# Patient Record
Sex: FEMALE | Race: WHITE | NOT HISPANIC OR LATINO | Employment: FULL TIME | URBAN - NONMETROPOLITAN AREA
[De-identification: names, ages, dates, MRNs, and addresses within clinical notes are randomized per-mention and may not be internally consistent; named-entity substitution may affect disease eponyms.]

---

## 2017-10-05 ENCOUNTER — OFFICE VISIT (OUTPATIENT)
Dept: RETAIL CLINIC | Facility: CLINIC | Age: 37
End: 2017-10-05

## 2017-10-05 VITALS
SYSTOLIC BLOOD PRESSURE: 116 MMHG | DIASTOLIC BLOOD PRESSURE: 75 MMHG | OXYGEN SATURATION: 98 % | TEMPERATURE: 97.3 F | HEART RATE: 66 BPM

## 2017-10-05 DIAGNOSIS — J01.40 ACUTE PANSINUSITIS, RECURRENCE NOT SPECIFIED: Primary | ICD-10-CM

## 2017-10-05 PROCEDURE — 99202 OFFICE O/P NEW SF 15 MIN: CPT | Performed by: NURSE PRACTITIONER

## 2017-10-05 RX ORDER — CETIRIZINE HYDROCHLORIDE 10 MG/1
10 TABLET ORAL DAILY
COMMUNITY

## 2017-10-05 RX ORDER — AZITHROMYCIN 250 MG/1
TABLET, FILM COATED ORAL
Qty: 1 TABLET | Refills: 0 | Status: SHIPPED | OUTPATIENT
Start: 2017-10-05 | End: 2018-08-19

## 2017-10-05 NOTE — PROGRESS NOTES
Subjective   Shruthi Weeks is a 37 y.o. female.     Sinusitis   This is a new problem. The current episode started 1 to 4 weeks ago (3 weeks). The problem has been gradually worsening (Last couple days has all gone to her head) since onset. Associated symptoms include congestion, coughing and sinus pressure (Left side). Pertinent negatives include no ear pain (Crackling, popping) or sore throat. Treatments tried: Aspirin; Zyrtec; Sudafed, Mucinex. The treatment provided no relief.        The following portions of the patient's history were reviewed and updated as appropriate: allergies, current medications, past family history, past medical history, past social history, past surgical history and problem list.    Review of Systems   Constitutional: Negative for fever.   HENT: Positive for congestion, postnasal drip and sinus pressure (Left side). Negative for ear pain (Crackling, popping) and sore throat.    Eyes: Negative.    Respiratory: Positive for cough.    Gastrointestinal: Negative for diarrhea, nausea and vomiting.   Allergic/Immunologic: Positive for environmental allergies.       Objective   Physical Exam   Constitutional: She appears well-developed and well-nourished. No distress.   HENT:   Right Ear: External ear normal. Tympanic membrane is bulging. Tympanic membrane is not erythematous.   Left Ear: External ear normal. Tympanic membrane is bulging. Tympanic membrane is not erythematous.   Nose: Right sinus exhibits no maxillary sinus tenderness and no frontal sinus tenderness. Left sinus exhibits no maxillary sinus tenderness and no frontal sinus tenderness.   Mouth/Throat: Posterior oropharyngeal erythema present. No oropharyngeal exudate.   More pressure felt to left side of face, near temporal area   Neck: Neck supple.   Cardiovascular: Normal rate, regular rhythm and normal heart sounds.  Exam reveals no gallop and no friction rub.    No murmur heard.  Pulmonary/Chest: Effort normal and breath  sounds normal. No respiratory distress. She has no wheezes. She has no rales.   Lymphadenopathy:     She has no cervical adenopathy.   Neurological: She is alert.   Skin: Skin is warm and dry. She is not diaphoretic.   Psychiatric: She has a normal mood and affect. Her behavior is normal.       Assessment/Plan   Shruthi was seen today for sinusitis.    Diagnoses and all orders for this visit:    Acute pansinusitis, recurrence not specified  -     azithromycin (ZITHROMAX Z-MARLINE) 250 MG tablet; Take 2 tablets the first day, then 1 tablet daily for 4 days.    Patient initially listed Z-pack and Keflex as an allergy due to swelling of the face.  States she was on both of them in July for rash.  Explained that rash worsened and was given a shot, a z-pack, and Keflex.  It was discovered through further conversation that the patient was actually given an injection, Keflex, and a Medrol Dose pack rather than the Z-pack.  She has not taken any PCN antibiotics since reaction.  Though she has taken them in the past without difficulty, will be cautious and give Z-pack for symptoms today.  Patient was instructed on what to do if swelling returned.       Increase fluid intake  Warm salt water gargles as needed for sore throat  Restart Flonase (2 sprays in each nostril once daily)  Saline Nasal sprays throughout the day to keep membranes moist  May restart Sudafed as needed for congestion  Do not over suppress cough  If no improvement over next 2-3 days or symptoms worsen, follow up with PCP

## 2017-10-05 NOTE — PATIENT INSTRUCTIONS
Increase fluid intake  Warm salt water gargles as needed for sore throat  Restart Flonase (2 sprays in each nostril once daily)  Saline Nasal sprays throughout the day to keep membranes moist  May restart Sudafed as needed for congestion  Do not over suppress cough  If no improvement over next 2-3 days or symptoms worsen, follow up with PCP      Sinusitis, Adult  Sinusitis is soreness and inflammation of your sinuses. Sinuses are hollow spaces in the bones around your face. Your sinuses are located:  · Around your eyes.  · In the middle of your forehead.  · Behind your nose.  · In your cheekbones.  Your sinuses and nasal passages are lined with a stringy fluid (mucus). Mucus normally drains out of your sinuses. When your nasal tissues become inflamed or swollen, the mucus can become trapped or blocked so air cannot flow through your sinuses. This allows bacteria, viruses, and funguses to grow, which leads to infection.  Sinusitis can develop quickly and last for 7-10 days (acute) or for more than 12 weeks (chronic). Sinusitis often develops after a cold.  CAUSES  This condition is caused by anything that creates swelling in the sinuses or stops mucus from draining, including:  · Allergies.  · Asthma.  · Bacterial or viral infection.  · Abnormally shaped bones between the nasal passages.  · Nasal growths that contain mucus (nasal polyps).  · Narrow sinus openings.  · Pollutants, such as chemicals or irritants in the air.  · A foreign object stuck in the nose.  · A fungal infection. This is rare.  RISK FACTORS  The following factors may make you more likely to develop this condition:  · Having allergies or asthma.  · Having had a recent cold or respiratory tract infection.  · Having structural deformities or blockages in your nose or sinuses.  · Having a weak immune system.  · Doing a lot of swimming or diving.  · Overusing nasal sprays.  · Smoking.  SYMPTOMS  The main symptoms of this condition are pain and a feeling  of pressure around the affected sinuses. Other symptoms include:  · Upper toothache.  · Earache.  · Headache.  · Bad breath.  · Decreased sense of smell and taste.  · A cough that may get worse at night.  · Fatigue.  · Fever.  · Thick drainage from your nose. The drainage is often green and it may contain pus (purulent).  · Stuffy nose or congestion.  · Postnasal drip. This is when extra mucus collects in the throat or back of the nose.  · Swelling and warmth over the affected sinuses.  · Sore throat.  · Sensitivity to light.  DIAGNOSIS  This condition is diagnosed based on symptoms, a medical history, and a physical exam. To find out if your condition is acute or chronic, your health care provider may:  · Look in your nose for signs of nasal polyps.  · Tap over the affected sinus to check for signs of infection.  · View the inside of your sinuses using an imaging device that has a light attached (endoscope).  If your health care provider suspects that you have chronic sinusitis, you may also:  · Be tested for allergies.  · Have a sample of mucus taken from your nose (nasal culture) and checked for bacteria.  · Have a mucus sample examined to see if your sinusitis is related to an allergy.  If your sinusitis does not respond to treatment and it lasts longer than 8 weeks, you may have an MRI or CT scan to check your sinuses. These scans also help to determine how severe your infection is.  In rare cases, a bone biopsy may be done to rule out more serious types of fungal sinus disease.  TREATMENT  Treatment for sinusitis depends on the cause and whether your condition is chronic or acute. If a virus is causing your sinusitis, your symptoms will go away on their own within 10 days. You may be given medicines to relieve your symptoms, including:  · Topical nasal decongestants. They shrink swollen nasal passages and let mucus drain from your sinuses.  · Antihistamines. These drugs block inflammation that is triggered by  allergies. This can help to ease swelling in your nose and sinuses.  · Topical nasal corticosteroids. These are nasal sprays that ease inflammation and swelling in your nose and sinuses.  · Nasal saline washes. These rinses can help to get rid of thick mucus in your nose.  If your condition is caused by bacteria, you will be given an antibiotic medicine. If your condition is caused by a fungus, you will be given an antifungal medicine.  Surgery may be needed to correct underlying conditions, such as narrow nasal passages. Surgery may also be needed to remove polyps.  HOME CARE INSTRUCTIONS  Medicines  · Take, use, or apply over-the-counter and prescription medicines only as told by your health care provider. These may include nasal sprays.  · If you were prescribed an antibiotic medicine, take it as told by your health care provider. Do not stop taking the antibiotic even if you start to feel better.  Hydrate and Humidify  · Drink enough water to keep your urine clear or pale yellow. Staying hydrated will help to thin your mucus.  · Use a cool mist humidifier to keep the humidity level in your home above 50%.  · Inhale steam for 10-15 minutes, 3-4 times a day or as told by your health care provider. You can do this in the bathroom while a hot shower is running.  · Limit your exposure to cool or dry air.  Rest  · Rest as much as possible.  · Sleep with your head raised (elevated).  · Make sure to get enough sleep each night.  General Instructions  · Apply a warm, moist washcloth to your face 3-4 times a day or as told by your health care provider. This will help with discomfort.  · Wash your hands often with soap and water to reduce your exposure to viruses and other germs. If soap and water are not available, use hand .  · Do not smoke. Avoid being around people who are smoking (secondhand smoke).  · Keep all follow-up visits as told by your health care provider. This is important.  SEEK MEDICAL CARE  IF:  · You have a fever.  · Your symptoms get worse.  · Your symptoms do not improve within 10 days.  SEEK IMMEDIATE MEDICAL CARE IF:  · You have a severe headache.  · You have persistent vomiting.  · You have pain or swelling around your face or eyes.  · You have vision problems.  · You develop confusion.  · Your neck is stiff.  · You have trouble breathing.     This information is not intended to replace advice given to you by your health care provider. Make sure you discuss any questions you have with your health care provider.     Document Released: 12/18/2006 Document Revised: 04/10/2017 Document Reviewed: 10/12/2016  [employer] Interactive Patient Education ©2017 [employer]

## 2018-08-19 ENCOUNTER — HOSPITAL ENCOUNTER (EMERGENCY)
Facility: HOSPITAL | Age: 38
Discharge: HOME | End: 2018-08-19
Admitting: EMERGENCY MEDICINE

## 2018-08-19 VITALS
TEMPERATURE: 98.6 F | DIASTOLIC BLOOD PRESSURE: 68 MMHG | SYSTOLIC BLOOD PRESSURE: 125 MMHG | BODY MASS INDEX: 30.53 KG/M2 | OXYGEN SATURATION: 99 % | WEIGHT: 190 LBS | HEIGHT: 66 IN | HEART RATE: 89 BPM | RESPIRATION RATE: 15 BRPM

## 2018-08-19 DIAGNOSIS — R21 RASH AND NONSPECIFIC SKIN ERUPTION: Primary | ICD-10-CM

## 2018-08-19 DIAGNOSIS — H60.502 ACUTE OTITIS EXTERNA OF LEFT EAR, UNSPECIFIED TYPE: ICD-10-CM

## 2018-08-19 PROCEDURE — 99283 EMERGENCY DEPT VISIT LOW MDM: CPT

## 2018-08-19 RX ORDER — PREDNISONE 20 MG/1
20 TABLET ORAL 2 TIMES DAILY
Qty: 14 TABLET | Refills: 0 | Status: SHIPPED | OUTPATIENT
Start: 2018-08-19 | End: 2018-08-26

## 2018-08-19 RX ORDER — DIPHENHYDRAMINE HCL 25 MG
25 CAPSULE ORAL NIGHTLY PRN
COMMUNITY

## 2018-08-19 RX ORDER — OFLOXACIN 3 MG/ML
5 SOLUTION AURICULAR (OTIC) DAILY
Qty: 2 ML | Refills: 0 | Status: SHIPPED | OUTPATIENT
Start: 2018-08-19 | End: 2018-08-26

## 2018-08-19 NOTE — ED NOTES
Patient is a 38 year old female that presents to ER with complaints of a rash. 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 Patient states that they were having air conditioning problem at the school and she first thought that she had heat rash under her arms and groin area. This was on Monday of last week.Patient states that the rash begin to get worse so she was seen by her PCP who  diagnosed her with folliculitis on Wednesday. Patient reports that she was given clindamycin and used a cream at home for treatment. Patient states that the rash continued to spread. Patient reports new lesions noticed on her hands and tops of feet.  Patient states that on Friday she went back to her PCP were she was diagnosed with hand, mouth, and foot. There are lesions to patients hands, feet, elbows that are raised and fluid filled. There are red, flat raised areas to patients inner groin and armpit area and tops of feet. Under patients bilateral armpits are red and rash is more extensive. Patient reports that rash and lesions are painful and itchy.      Antonio Reed RN  08/19/18 1149

## 2018-08-19 NOTE — DISCHARGE INSTRUCTIONS
Please take the medication as discussed. It is very important to follow up with your primary care provider for further evaluation and possible referral to Dermatology. Return to the ER immediately if you develop any CP, SOB, or any other new, worsening, or concerning symptoms.

## 2018-08-19 NOTE — ED PROVIDER NOTES
Subjective   38-year-old  female presents to the emergency department with rash and ear pain. Onset of rash was 6 days prior to arrival.  Patient states that she originally developed bumps on her hand which then progressed to bilateral armpits, inner thighs, and feet. Pt was seen by her PCP and was dx with folliculitis and then HFM disease. Pt is a  and recently started back less than 2 weeks ago. 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 In addition pt also complains of left ear pain. Pt denies hearing loss, tinnitus, but admits to swimming lately. Pt denies fever, chills, n/v/d.         History provided by:  Patient   used: No        Review of Systems   Constitutional: Negative for chills and fever.   HENT: Positive for ear pain. Negative for congestion and sore throat.    Respiratory: Negative for cough and shortness of breath.    Cardiovascular: Negative for chest pain and palpitations.   Gastrointestinal: Negative for abdominal pain, nausea and vomiting.   Genitourinary: Negative for dysuria and hematuria.   Musculoskeletal: Negative for arthralgias and neck pain.   Skin: Positive for rash. Negative for wound.   Neurological: Negative for dizziness, weakness and headaches.   Hematological: Negative for adenopathy. Does not bruise/bleed easily.       Past Medical History:   Diagnosis Date   • Acid reflux    • Allergic    • History of ear infections    • Sinusitis        Allergies   Allergen Reactions   • Keflex [Cephalexin] Swelling       Past Surgical History:   Procedure Laterality Date   •  SECTION         Family History   Problem Relation Age of Onset   • Diabetes Mother    • Obesity Mother    • Heart disease Other    • Diabetes Other    • Stroke Other        Social History     Social History   • Marital status:      Social History Main  "Topics   • Smoking status: Never Smoker   • Alcohol use No   • Drug use: No     Other Topics Concern   • Not on file       Lab Results (last 24 hours)     ** No results found for the last 24 hours. **          Objective   Physical Exam   Constitutional: She is oriented to person, place, and time. Vital signs are normal. She appears well-developed and well-nourished. No distress.   HENT:   Head: Normocephalic and atraumatic.   Right Ear: Hearing, tympanic membrane, external ear and ear canal normal.   Left Ear: Hearing, tympanic membrane and external ear normal.   Mouth/Throat: Oropharynx is clear and moist.   Erythema and debris of left external canal.    Eyes: Pupils are equal, round, and reactive to light. Conjunctivae and EOM are normal.   Neck: Normal range of motion.   Cardiovascular: Normal rate, regular rhythm, normal heart sounds and intact distal pulses.  Exam reveals no friction rub.    No murmur heard.  Pulmonary/Chest: Effort normal and breath sounds normal. No respiratory distress. She has no wheezes. She has no rales. She exhibits no tenderness.   Abdominal: Soft. Bowel sounds are normal. She exhibits no distension and no mass. There is no tenderness. There is no rebound and no guarding.   Musculoskeletal: Normal range of motion.   Neurological: She is alert and oriented to person, place, and time.   Skin: Skin is warm and dry. Capillary refill takes less than 2 seconds. Rash noted. Rash is papular and maculopapular. She is not diaphoretic.        Papule rash on the hands and rear of ankles and top of feet.    Plaque like rash of the bilateral armpits extending into the upper forearms.  Also present on the inner thighs.  No extensive red streaking   Psychiatric: She has a normal mood and affect. Her behavior is normal.   Nursing note and vitals reviewed.      Procedures         No orders to display       /68   Pulse 89   Temp 98.6 °F (37 °C)   Resp 15   Ht 167.6 cm (66\")   Wt 86.2 kg (190 " lb)   SpO2 99%   BMI 30.67 kg/m²     ED Course         Medications - No data to display         MDM  Number of Diagnoses or Management Options  Acute otitis externa of left ear, unspecified type: new and does not require workup  Rash and nonspecific skin eruption: new and does not require workup  Diagnosis management comments: Patient with 6 days of nonspecific rash that appears to be a combination of 2 etiologies.  Rash on the have the feet appear like hand-foot-and-mouth disease and the bilateral armpits seems more like psoriasis type rash.  Patient has tried clindamycin without relief.  We will give patient a 7 day course of steroids.  After Joe examined and interviewed this patient and recommended to treat with 7 day treatment of steroids.  She has no respiratory symptoms or mucosal mouth lesions.  Patient understands to follow with her primary care doctor for probable referral to dermatologist.  In addition patient also has otitis externa of the left ear.  Canal is mildly erythematous with edema and debris no need for wick at this time.  We will prescribe antibiotic drops.  I discussed very strict return precautions with the patient to which she verbally agrees.    Risk of Complications, Morbidity, and/or Mortality  Presenting problems: moderate  Diagnostic procedures: moderate  Management options: moderate    Patient Progress  Patient progress: stable      Final diagnoses:   Rash and nonspecific skin eruption   Acute otitis externa of left ear, unspecified type          Juan J Lozano PA-C  08/19/18 7211

## 2023-06-16 ENCOUNTER — TELEPHONE (OUTPATIENT)
Dept: OTOLARYNGOLOGY | Facility: CLINIC | Age: 43
End: 2023-06-16
Payer: COMMERCIAL

## 2023-06-16 NOTE — TELEPHONE ENCOUNTER
Call placed to patient with no answer, voicemail left informing of appointments on 8/10/2023 at 1430 and 1445, requested call back and reminder mailed.

## 2023-08-24 ENCOUNTER — TELEPHONE (OUTPATIENT)
Dept: OTOLARYNGOLOGY | Facility: CLINIC | Age: 43
End: 2023-08-24
Payer: COMMERCIAL

## 2023-08-24 NOTE — TELEPHONE ENCOUNTER
LVM informing pt that the audiologist called in sick and we need to r/s her appt. Requested call back to r/s

## 2023-10-09 ENCOUNTER — PROCEDURE VISIT (OUTPATIENT)
Dept: ENT CLINIC | Age: 43
End: 2023-10-09
Payer: COMMERCIAL

## 2023-10-09 ENCOUNTER — OFFICE VISIT (OUTPATIENT)
Dept: ENT CLINIC | Age: 43
End: 2023-10-09
Payer: COMMERCIAL

## 2023-10-09 VITALS
DIASTOLIC BLOOD PRESSURE: 72 MMHG | SYSTOLIC BLOOD PRESSURE: 130 MMHG | HEIGHT: 65 IN | WEIGHT: 202 LBS | BODY MASS INDEX: 33.66 KG/M2

## 2023-10-09 DIAGNOSIS — H66.3X1 CHRONIC SUPPURATIVE OTITIS MEDIA OF RIGHT EAR, UNSPECIFIED OTITIS MEDIA LOCATION: Primary | ICD-10-CM

## 2023-10-09 DIAGNOSIS — H93.13 TINNITUS OF BOTH EARS: Primary | ICD-10-CM

## 2023-10-09 PROCEDURE — 92567 TYMPANOMETRY: CPT | Performed by: AUDIOLOGIST

## 2023-10-09 PROCEDURE — 99203 OFFICE O/P NEW LOW 30 MIN: CPT | Performed by: PHYSICIAN ASSISTANT

## 2023-10-09 PROCEDURE — 92553 AUDIOMETRY AIR & BONE: CPT | Performed by: AUDIOLOGIST

## 2023-10-09 RX ORDER — DIPHENHYDRAMINE HCL 25 MG
25 CAPSULE ORAL NIGHTLY PRN
COMMUNITY

## 2023-10-09 RX ORDER — CETIRIZINE HYDROCHLORIDE 10 MG/1
10 TABLET ORAL DAILY
COMMUNITY

## 2023-10-09 RX ORDER — PREDNISONE 20 MG/1
TABLET ORAL
Qty: 20 TABLET | Refills: 0 | Status: SHIPPED | OUTPATIENT
Start: 2023-10-09

## 2023-10-09 RX ORDER — METOPROLOL SUCCINATE 25 MG/1
25 TABLET, EXTENDED RELEASE ORAL DAILY
COMMUNITY
Start: 2023-08-05

## 2023-10-09 RX ORDER — CLINDAMYCIN HYDROCHLORIDE 300 MG/1
300 CAPSULE ORAL 3 TIMES DAILY
Qty: 30 CAPSULE | Refills: 0 | Status: SHIPPED | OUTPATIENT
Start: 2023-10-09 | End: 2023-10-19

## 2023-10-09 RX ORDER — ECHINACEA PURPUREA EXTRACT 125 MG
2 TABLET ORAL NIGHTLY
Qty: 1 EACH | Refills: 3 | Status: SHIPPED | OUTPATIENT
Start: 2023-10-09

## 2023-10-09 ASSESSMENT — ENCOUNTER SYMPTOMS
EYE PAIN: 0
VOICE CHANGE: 0
RHINORRHEA: 0
FACIAL SWELLING: 0
EYE DISCHARGE: 0
TROUBLE SWALLOWING: 0
SORE THROAT: 0
SINUS PRESSURE: 0
SINUS PAIN: 0

## 2023-10-09 NOTE — PROGRESS NOTES
[facility] OTOLARYNGOLOGY/ENT  Mrs. Oma Reid is a pleasant 69-year-old  female that was referred by Le Denney due to problems with chronic fullness to the ears. 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 She reports that at times her hearing seems muffled with this being more prominent to the right side. She denies any drainage from the ears but does admit to sporadic issues with dizziness/unsteadiness. Audiology studies were completed today and demonstrated patient have normal hearing bilaterally. Tympanograms noted be type A bilaterally. Allergies: Cephalexin - facial swelling      Current Outpatient Medications   Medication Sig Dispense Refill    clindamycin (CLEOCIN) 300 MG capsule Take 1 capsule by mouth 3 times daily for 10 days 30 capsule 0    predniSONE (DELTASONE) 20 MG tablet Take 1 tab po bid x 6 days, decrease to 1 tab po q d x 4 days, then decrease to half tablet po q d x 2 days. 20 tablet 0    sodium chloride (OCEAN NASAL SPRAY) 0.65 % nasal spray 2 sprays by Nasal route nightly 1 each 3    metoprolol succinate (TOPROL XL) 25 MG extended release tablet Take 1 tablet by mouth daily high blood pressure      cetirizine (ZYRTEC) 10 MG tablet Take 1 tablet by mouth daily      diphenhydrAMINE (BENADRYL) 25 MG capsule Take 1 capsule by mouth nightly as needed       No current facility-administered medications for this visit. Past Surgical History:   Procedure Laterality Date     SECTION         Past Medical History:   Diagnosis Date    Hypertension        No family history on file. Social History     Tobacco Use    Smoking status: Never    Smokeless tobacco: Never   Substance Use Topics    Alcohol use: Yes     Comment: rare           REVIEW OF SYSTEMS:  all other systems reviewed and are negative  Review of Systems   Constitutional:  Negative for chills and fever.    HENT:  Negative for

## 2023-10-09 NOTE — PROGRESS NOTES
History   Bandar Godoy is a 39 y.o. female who presented to the clinic this date with complaints of bilateral tinnitus, itchy ear canals, and ears clicking/popping. 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 She reported symptoms are worse in the right ear and have been ongoing off and on for several years. She reported history of recurrent bilateral ear infection in childhood. Summary   Tympanometry consistent with slightly reduced TM mobility right and normal TM mobility left. Pure tone testing indicates normal hearing bilaterally. Results   Otoscopy:   Right: Dull/red TM  Left: Clear EAC/Normal TM    Audiometry:   Right: Hearing WNL    Left: Hearing WNL           Tympanometry:    Right: Type A  Left: Type A    Plan   Results of today's testing were discussed with Ms. Padmini Ovalle and the following recommendations were made: Follow up with ENT as scheduled.         Audiogram and Acoustic Immittance

## 2023-10-09 NOTE — ASSESSMENT & PLAN NOTE
Chronic right otitis media  Plan: We will place the patient on 10 days of clindamycin as well as prednisone. We will also add a saline nasal spray nightly. She is follow-up in 2 weeks for reevaluation. She was reminded to call if she has any questions or problems.

## 2023-11-07 ENCOUNTER — OFFICE VISIT (OUTPATIENT)
Dept: ENT CLINIC | Age: 43
End: 2023-11-07
Payer: COMMERCIAL

## 2023-11-07 VITALS
WEIGHT: 202 LBS | HEIGHT: 65 IN | BODY MASS INDEX: 33.66 KG/M2 | DIASTOLIC BLOOD PRESSURE: 68 MMHG | SYSTOLIC BLOOD PRESSURE: 132 MMHG

## 2023-11-07 DIAGNOSIS — H66.3X1 CHRONIC SUPPURATIVE OTITIS MEDIA OF RIGHT EAR, UNSPECIFIED OTITIS MEDIA LOCATION: Primary | ICD-10-CM

## 2023-11-07 DIAGNOSIS — H65.91 MEE (MIDDLE EAR EFFUSION), RIGHT: ICD-10-CM

## 2023-11-07 PROCEDURE — 99213 OFFICE O/P EST LOW 20 MIN: CPT | Performed by: PHYSICIAN ASSISTANT

## 2023-11-07 RX ORDER — ECHINACEA PURPUREA EXTRACT 125 MG
2 TABLET ORAL 2 TIMES DAILY
Qty: 1 EACH | Refills: 3 | Status: SHIPPED | OUTPATIENT
Start: 2023-11-07

## 2023-11-07 RX ORDER — ROSUVASTATIN CALCIUM 5 MG/1
TABLET, COATED ORAL
COMMUNITY
Start: 2023-10-09

## 2023-11-07 RX ORDER — PREDNISONE 20 MG/1
TABLET ORAL
Qty: 20 TABLET | Refills: 0 | Status: SHIPPED | OUTPATIENT
Start: 2023-11-07

## 2023-11-07 RX ORDER — PSEUDOEPHEDRINE HCL 30 MG
30 TABLET ORAL 3 TIMES DAILY
Qty: 30 TABLET | Refills: 2 | Status: SHIPPED | OUTPATIENT
Start: 2023-11-07

## 2023-11-07 NOTE — PROGRESS NOTES
Sathish Snyder is a pleasant 43-year-old  female who presents for 2-week follow-up after treatment for right-sided otitis media with middle ear effusion. Patient reports that the muffled hearing resolved for a few days and then returned after completing her prednisone therapy. She reports that her ears are constantly popping off and on. She denies any pain or drainage from the ears. Physical examination with the microscope revealed the patient to have fluid to the anterior portion of the TM with air-fluid levels noted, however this seems to be much improved compared to last visit with no evidence of infection. Pneumonic exam demonstrated the patient to have much improved mobility. Examination of the left ear demonstrated normal TM and canal.  Neck exam demonstrated no lymphadenopathy or thyromegaly. Oral exam was unrevealing. Impression: Clinically resolved otitis media of the right ear with improving right middle ear effusion    Plan: I recommended giving the patient a second round of prednisone and will add Sudafed and saline nasal spray to the regiment. She is to follow-up in 2 weeks for reevaluation.       Electronically signed by Will Cooper PA-C on 11/7/23 at 10:59 AM CST

## 2023-11-30 ENCOUNTER — OFFICE VISIT (OUTPATIENT)
Dept: ENT CLINIC | Age: 43
End: 2023-11-30
Payer: COMMERCIAL

## 2023-11-30 VITALS
BODY MASS INDEX: 33.66 KG/M2 | DIASTOLIC BLOOD PRESSURE: 76 MMHG | WEIGHT: 202 LBS | HEIGHT: 65 IN | SYSTOLIC BLOOD PRESSURE: 122 MMHG

## 2023-11-30 DIAGNOSIS — H65.91 MEE (MIDDLE EAR EFFUSION), RIGHT: Primary | ICD-10-CM

## 2023-11-30 PROCEDURE — 99213 OFFICE O/P EST LOW 20 MIN: CPT | Performed by: PHYSICIAN ASSISTANT

## 2023-11-30 NOTE — PROGRESS NOTES
James Huynh is a pleasant 24-year-old  female that presents for a 2-week follow-up after treatment for a right middle ear effusion that occurred after right otitis media. Patient reports that her ears feel back to normal and she is no longer having muffled hearing or any otalgia. Physical examination with the microscope revealed the patient have normal TMs canals bilaterally. There was no evidence of a middle ear effusion or air-fluid levels. Neck exam demonstrated no lymphadenopathy or thyromegaly. Oral exam demonstrated no abnormalities to the posterior pharynx. Impression: Resolved right middle ear effusion with no evidence of otitis media    Plan: Due to the patient being back to baseline, she is to follow-up with me as needed. She was reminded to call if she has any questions or problems.         Electronically signed by Vic Albarran PA-C on 11/30/23 at 4:11 PM CST